# Patient Record
Sex: FEMALE | Race: WHITE | Employment: FULL TIME | URBAN - METROPOLITAN AREA
[De-identification: names, ages, dates, MRNs, and addresses within clinical notes are randomized per-mention and may not be internally consistent; named-entity substitution may affect disease eponyms.]

---

## 2017-12-26 ENCOUNTER — HOSPITAL ENCOUNTER (OUTPATIENT)
Dept: MAMMOGRAPHY | Age: 47
Discharge: HOME | End: 2017-12-26
Attending: INTERNAL MEDICINE | Admitting: INTERNAL MEDICINE

## 2017-12-26 DIAGNOSIS — R92.0 ABNORMAL MAMMOGRAM WITH MICROCALCIFICATION: ICD-10-CM

## 2018-01-10 ENCOUNTER — HOSPITAL ENCOUNTER (OUTPATIENT)
Dept: MRI IMAGING | Age: 48
Discharge: HOME | End: 2018-01-10
Attending: SURGERY | Admitting: SURGERY

## 2018-01-10 DIAGNOSIS — D05.12 INTRADUCTAL CARCINOMA OF LEFT BREAST: ICD-10-CM

## 2018-01-10 DIAGNOSIS — D05.12 INTRADUCTAL CARCINOMA IN SITU OF LEFT BREAST: ICD-10-CM

## 2018-05-31 ENCOUNTER — HOSPITAL ENCOUNTER (OUTPATIENT)
Dept: MRI IMAGING | Age: 48
Discharge: HOME | End: 2018-05-31
Attending: SURGERY | Admitting: SURGERY

## 2018-05-31 DIAGNOSIS — R92.8 ABNORMAL MAMMOGRAM: ICD-10-CM

## 2018-05-31 DIAGNOSIS — R92.8 OTHER ABNORMAL AND INCONCLUSIVE FINDINGS ON DIAGNOSTIC IMAGING OF BREAST: ICD-10-CM

## 2018-05-31 DIAGNOSIS — D05.10 DUCTAL CARCINOMA IN SITU (DCIS) OF BREAST, UNSPECIFIED LATERALITY: ICD-10-CM

## 2018-07-31 ENCOUNTER — HOSPITAL ENCOUNTER (OUTPATIENT)
Dept: WOMENS IMAGING | Age: 48
Discharge: HOME | End: 2018-07-31
Payer: COMMERCIAL

## 2018-07-31 DIAGNOSIS — Z13.820 OSTEOPOROSIS SCREENING: ICD-10-CM

## 2018-07-31 DIAGNOSIS — Z13.820 ENCOUNTER FOR SCREENING FOR OSTEOPOROSIS: ICD-10-CM

## 2018-07-31 PROCEDURE — 77080 DXA BONE DENSITY AXIAL: CPT

## 2018-12-12 ENCOUNTER — HOSPITAL ENCOUNTER (OUTPATIENT)
Dept: WOMENS IMAGING | Age: 48
Discharge: HOME | End: 2018-12-12
Payer: COMMERCIAL

## 2018-12-12 DIAGNOSIS — D05.90 CARCINOMA IN SITU OF BREAST, UNSPECIFIED LATERALITY, UNSPECIFIED TYPE: ICD-10-CM

## 2018-12-12 DIAGNOSIS — R92.8 ABNORMAL MAMMOGRAM: ICD-10-CM

## 2018-12-12 DIAGNOSIS — Z85.3 HX OF BREAST CANCER: ICD-10-CM

## 2018-12-12 PROCEDURE — 76642 ULTRASOUND BREAST LIMITED: CPT

## 2018-12-12 PROCEDURE — G0279 TOMOSYNTHESIS, MAMMO: HCPCS

## 2019-12-17 ENCOUNTER — HOSPITAL ENCOUNTER (OUTPATIENT)
Dept: WOMENS IMAGING | Age: 49
Discharge: HOME | End: 2019-12-17
Payer: COMMERCIAL

## 2019-12-17 DIAGNOSIS — R92.8 ABNORMAL MAMMOGRAM OF RIGHT BREAST: ICD-10-CM

## 2019-12-17 DIAGNOSIS — D05.12 DUCTAL CARCINOMA IN SITU (DCIS) OF LEFT BREAST: ICD-10-CM

## 2019-12-17 PROCEDURE — 76642 ULTRASOUND BREAST LIMITED: CPT

## 2019-12-17 PROCEDURE — G0279 TOMOSYNTHESIS, MAMMO: HCPCS

## 2020-01-14 ENCOUNTER — HOSPITAL ENCOUNTER (OUTPATIENT)
Dept: MRI IMAGING | Age: 50
Discharge: HOME | End: 2020-01-14
Payer: COMMERCIAL

## 2020-01-14 PROCEDURE — 6360000004 HC RX CONTRAST MEDICATION: Performed by: SURGERY

## 2020-01-14 PROCEDURE — A9579 GAD-BASE MR CONTRAST NOS,1ML: HCPCS | Performed by: SURGERY

## 2020-01-14 PROCEDURE — 77049 MRI BREAST C-+ W/CAD BI: CPT

## 2020-01-14 RX ADMIN — GADOTERIDOL 14 ML: 279.3 INJECTION, SOLUTION INTRAVENOUS at 12:26

## 2020-01-17 ENCOUNTER — TELEPHONE (OUTPATIENT)
Dept: WOMENS IMAGING | Age: 50
End: 2020-01-17

## 2020-01-17 NOTE — TELEPHONE ENCOUNTER
Nurse Navigator reviewed the education with the patient regarding an MRI biopsy:   *The technologist or a nurse may ask if you have allergies of any kind, such as an allergy to iodine or x-ray contrast material, drugs, food, or the environment, or if you have asthma. The contrast material most commonly used for an MRI exam contains a metal called gadolinium.  It is fine to eat and drink prior to the procedure and we prefer that you do so.  Bring a written list of the medications you are taking. Plan on being at the breast center for 2 -3 hours.  Wear a bra with good support (like a sports bra) and a two-piece comfortable outfit for the procedure.  You can bring someone with you but it is not required, since this is done with local anesthetic. You may drive yourself, or bring a family member or friend, whatever is comfortable and supportive.  If you have claustrophobia (fear of enclosed spaces) or anxiety, you may want to ask your physician for a prescription for a mild sedative prior to your scheduled examination. If this is done, please have someone drive you to the procedure. During your biopsy:   You will be lying on your stomach for this procedure just as when you had the MRI.  If a contrast material will be used in the MRI exam, the technologist will insert an intravenous (IV) catheter, into a vein in your hand or arm.  You will be placed into the magnet of the MRI unit and the radiologist and technologist will perform the examination while working at a computer outside of the room.  If a contrast material is used during the examination, it will be injected into the intravenous line (IV) after an initial series of scans. Additional series of images will be taken during or following the injection and at different points during the procedure.  Then the skin is cleaned and a local anesthetic (slight pin prick) is given to numb the specific area which takes effect very quickly.   A small skin nick is made so that the biopsy needle can be inserted.  Once the biopsy needle is in the proper position, samples will be taken. A tiny tissue marker (titanium) is then placed inside your breast at the site of the biopsy for future reference.  Pressure is then held over the biopsy site to stop the bleeding. No sutures are necessary as steri-strips and a waterproof dressing are applied.  Most women report little or no pain and no scarring on the breast, though some bruising can occur. After your biopsy:    A mammogram may be done to make sure the tissue marker is in the right place.  You will receive a set of follow-up instructions from the nurse navigator.  The biopsy sample is sent to the Pathology department for analysis.  Results of the biopsy will come back in 2-3 business days. The results will be sent to your referring physician. Patient verbalized understanding.

## 2020-01-24 ENCOUNTER — HOSPITAL ENCOUNTER (OUTPATIENT)
Dept: MRI IMAGING | Age: 50
Discharge: HOME | End: 2020-01-24
Payer: COMMERCIAL

## 2020-01-24 ENCOUNTER — HOSPITAL ENCOUNTER (OUTPATIENT)
Dept: WOMENS IMAGING | Age: 50
Discharge: HOME | End: 2020-01-24
Payer: COMMERCIAL

## 2020-01-24 PROCEDURE — 77065 DX MAMMO INCL CAD UNI: CPT

## 2020-01-24 PROCEDURE — A9579 GAD-BASE MR CONTRAST NOS,1ML: HCPCS | Performed by: SURGERY

## 2020-01-24 PROCEDURE — 6360000004 HC RX CONTRAST MEDICATION: Performed by: SURGERY

## 2020-01-24 PROCEDURE — 19086 BX BREAST ADD LESION MR IMAG: CPT

## 2020-01-24 PROCEDURE — 19085 BX BREAST 1ST LESION MR IMAG: CPT

## 2020-01-24 PROCEDURE — 88305 TISSUE EXAM BY PATHOLOGIST: CPT

## 2020-01-24 RX ADMIN — GADOTERIDOL 14 ML: 279.3 INJECTION, SOLUTION INTRAVENOUS at 12:13

## 2020-01-24 NOTE — PROGRESS NOTES
Patient in [facility] for breast biopsy. Radiologist reviewed procedure with patient, consent signed. Patient tolerated procedure well. Compression held. Site cleansed with chloraprep, steri strips and dry dressing applied. Ice pack provided. Reviewed discharge instructions with patient. Patient verbalized understanding and agreed to contact Nurse Navigator with any questions. Patient was A&Ox3 and steady on feet and discharged to waiting area. [facility] [address]  [address]  [address]  Telephone: [phone]    NAME:  Hailee Bras OF BIRTH:  1970  GENDER: female  MEDICAL RECORD NUMBER:  [id number]  TODAY'S DATE:  1/24/2020    Discharge Instructions [facility]:    Post Breast Biopsy Instructions     [x] You may remove your outer dressing in 24 hours and you may shower. \"Steri-strips\" tape will stay on for 5-7 days. [x] Place a cold pack inside your bra on top of the dressing for at least 3 hours, removing it every 15 minutes for 15 minutes after your biopsy. [x] Wear a firm fitting bra for at least 24 hours after your biopsy, including while you sleep. [x] Place an ice pack inside your bra on top of the dressing for at least 3 hours, removing it every 15 minutes for 15 minutes after your biopsy. [x] You may resume your held medications tomorrow, unless otherwise directed by your physician. [x] Your physician has instructed you to take Tylenol (Acetaminophen) the day of your biopsy for any discomfort. [x] Watch for excessive bleeding. If bleeding occurs apply pressure to site. Watch for signs of infection, increased pain, redness, swelling and heat. If this occurs call your physician. [x] Do not participate in any strenuous exercise for 48 hours after your biopsy and do not lift, pull or push anything over 5-10 lbs. [x] Results will be available in 2-3 working days.   Your referring physician or the Nurse Navigator will call you with results. [facility] Information:   Should you experience any significant changes in your health or have questions about your care, please contact:  Jose Girard Nurse [facility]ulator [facility], [address]  Monday-Friday. If you need help with your care outside these hours and cannot wait until we are again available, contact your Physician or go to the hospital emergency.        Electronically signed by Jose Girard RN on 1/24/2020 at 1:40 PM

## 2020-01-29 ENCOUNTER — HOSPITAL ENCOUNTER (OUTPATIENT)
Age: 50
Setting detail: SPECIMEN
Discharge: HOME | End: 2020-01-29
Payer: COMMERCIAL

## 2020-01-30 NOTE — PROGRESS NOTES
during my hospitalization, the order may or may not be in effect during this procedure. I give my doctor permission to give me blood or blood products. I understand that there are risks with receiving blood such as hepatitis, AIDS, fever, or allergic reaction. I acknowledge that the risks, benefits, and alternatives of this treatment have been explained to me and that no express or implied warranty has been given by the hospital, any blood bank, or any person or entity as to the blood or blood components transfused. At the discretion of my doctor, I agree to allow observers, equipment/product representatives and allow photographing, and/or televising of the procedure, provided my name or identity is maintained confidentially. I agree the hospital may dispose of or use for scientific or educational purposes any tissue, fluid, or body parts which may be removed.     ________________________________Date________Time______ am/pm  ([place] [facility])  Patient or Signature of Closest Relative or Legal Guardian    ________________________________Date________Time______am/pm      Page 1 of  1  Witness

## 2020-01-31 RX ORDER — TAMOXIFEN CITRATE 20 MG/1
TABLET ORAL
COMMUNITY
Start: 2020-01-07 | End: 2020-05-01 | Stop reason: ALTCHOICE

## 2020-01-31 NOTE — PROGRESS NOTES
[facility] PRE-SURGICAL TESTING INSTRUCTIONS                              PRIOR TO PROCEDURE DATE:  1. Please follow any guidelines/instructions prior to your procedure as advised by your surgeon. 2. Arrange for someone to drive you home and be with you for the first 24 hours after discharge for your safety after your procedure for which you received sedation. Ensure it is someone we can share information with regarding your discharge. 3. You must contact your surgeon for instructions IF:   You are taking any blood thinners, aspirin, anti-inflammatory or vitamin E.   There is a change in your physical condition such as a cold, fever, rash, cuts, sores or any other infection, especially near your surgical site. 4. Do not drink alcohol the day before or day of your procedure. 5. A Pre-op History and Physical for surgery MUST be completed by your Physician or Urgent Care within 30 days of your procedure date. Please bring a copy with you on the day of your procedure and along with any other testing performed. THE DAY OF YOUR PROCEDURE:  1. Follow instructions for ARRIVAL TIME as DIRECTED BY YOUR SURGEON. I    2. Enter the MAIN entrance from [employer] and follow the signs to the free [email or web address] or [employer] parking (offered free of charge 6am-5pm). 3. Enter the Main Entrance of the hospital (do not enter from the lower level of the parking garage). Upon entrance, check in with the  at the main desk on your left. If no one is available at the desk, proceed into the [facility] Waiting Room and go through the door directly into the [facility]. There is a Check-in desk ACROSS from Room 5 (marked with a sign hanging from the ceiling). The phone number for the surgery center is [phone]. 4. Please call [phone] option #2 option #2 if you have not been preregistered yet. On the day of your procedure bring your insurance card and photo ID.  You will be registered at your bedside once brought back to your room. 5. DO NOT EAT ANYTHING eight hours prior to surgery. May have 8 ounces of water 4 hours prior to surgery. 6. MEDICATIONS    Take the following medications with a SMALL sip of water: Tamoxifen   Use your usual dose of inhalers the morning of surgery. BRING your rescue inhaler with you to hospital.    Anesthesia does NOT want you to take insulin the morning of surgery. They will control your blood sugar while you are at the hospital. Please contact your ordering physician for instructions regarding your insulin the night before your procedure. If you have an insulin pump, please keep it set on basal rate. 7. Do not swallow water when brushing teeth. No gum, candy, mints or ice chips. Refrain from smoking or at least decrease the amount. 8. Dress in loose, comfortable clothing appropriate for redressing after your procedure. Do not wear jewelry (including body piercings), make-up (especially NO eye make-up), fingernail polish (NO toenail polish if foot/leg surgery), lotion, powders or metal hairclips. 9. Dentures, glasses, or contacts will need to be removed before your procedure. Bring cases for your glasses, contacts, dentures, or hearing aids to protect them while you are in surgery. 10. If you use a CPAP, please bring it with you on the day of your procedure. 11. We recommend that valuable personal  belongings such as cash, cell phones, e-tablets or jewelry, be left at home during your stay. The hospital will not be responsible for valuables that are not secured in the hospital safe. However, if your insurance requires a co-pay, you may want to bring a method of payment, i.e. Check or credit card, if you wish to pay your co-pay the day of surgery. 12. If you are to stay overnight, you may bring a bag with personal items.  Please have any large items you may need brought in by your family after your arrival to your hospital potential side effects. 2. For comfort and safety, arrange to have someone at home with you for the first 24 hours after discharge. 3. You and your family will be given written instructions about your diet, activity, dressing care, medications, and return visits. 4. Once at home, should issues with nausea, pain, or bleeding occur, or should you notice any signs of infection, you should call your surgeon. 5. Always clean your hands before and after caring for your wound. Do not let your family touch your surgery site without cleaning their hands. 6. Narcotic pain medications can cause significant constipation. You may want to add a stool softener to your postoperative medication schedule or speak to your surgeon on how best to manage this SIDE EFFECT. SPECIAL INSTRUCTIONS     Thank you for allowing us to care for you. We strive to exceed your expectations in the delivery of care and service provided to you and your family. If you need to contact us for any reason, please call us at [phone]    Instructions reviewed with patient during preadmission testing phone interview. Lynne Lora. 1/31/2020 .12:22 PM      ADDITIONAL EDUCATIONAL INFORMATION REVIEWED PER PHONE WITH YOU AND/OR YOUR FAMILY:  Yes Bring a urine sample on day of surgery  No Pain Goal-Taking Control of Your Pain  Yes FAQs about Surgical Site Infections  No Hibiclens® Bathing Instructions   Yes Antibacterial Soap  No Juan Antonio® Wipes Bathing Instructions (Obtained from: https://www.[employer]/. pdf )  No Incentive Spirometer Education  No Other

## 2020-02-04 ENCOUNTER — ANESTHESIA EVENT (OUTPATIENT)
Dept: OPERATING ROOM | Age: 50
End: 2020-02-04
Payer: COMMERCIAL

## 2020-02-05 ENCOUNTER — HOSPITAL ENCOUNTER (OUTPATIENT)
Dept: MAMMOGRAPHY | Age: 50
Discharge: HOME | End: 2020-02-05
Payer: COMMERCIAL

## 2020-02-05 ENCOUNTER — HOSPITAL ENCOUNTER (OUTPATIENT)
Dept: NUCLEAR MEDICINE | Age: 50
Discharge: HOME | End: 2020-02-05
Payer: COMMERCIAL

## 2020-02-05 ENCOUNTER — HOSPITAL ENCOUNTER (OUTPATIENT)
Age: 50
Setting detail: OUTPATIENT SURGERY
Discharge: HOME | End: 2020-02-05
Attending: SURGERY | Admitting: SURGERY
Payer: COMMERCIAL

## 2020-02-05 ENCOUNTER — ANESTHESIA (OUTPATIENT)
Dept: OPERATING ROOM | Age: 50
End: 2020-02-05
Payer: COMMERCIAL

## 2020-02-05 VITALS
OXYGEN SATURATION: 95 % | TEMPERATURE: 97.5 F | HEART RATE: 74 BPM | SYSTOLIC BLOOD PRESSURE: 112 MMHG | WEIGHT: 150 LBS | DIASTOLIC BLOOD PRESSURE: 76 MMHG | RESPIRATION RATE: 16 BRPM | HEIGHT: 64 IN | BODY MASS INDEX: 25.61 KG/M2

## 2020-02-05 VITALS
OXYGEN SATURATION: 100 % | DIASTOLIC BLOOD PRESSURE: 53 MMHG | SYSTOLIC BLOOD PRESSURE: 87 MMHG | RESPIRATION RATE: 19 BRPM | TEMPERATURE: 95 F

## 2020-02-05 LAB — PREGNANCY, URINE: NEGATIVE

## 2020-02-05 PROCEDURE — A9541 TC99M SULFUR COLLOID: HCPCS | Performed by: SURGERY

## 2020-02-05 PROCEDURE — 88342 IMHCHEM/IMCYTCHM 1ST ANTB: CPT

## 2020-02-05 PROCEDURE — 2580000003 HC RX 258: Performed by: ANESTHESIOLOGY

## 2020-02-05 PROCEDURE — 88305 TISSUE EXAM BY PATHOLOGIST: CPT

## 2020-02-05 PROCEDURE — 76098 X-RAY EXAM SURGICAL SPECIMEN: CPT

## 2020-02-05 PROCEDURE — 6360000002 HC RX W HCPCS: Performed by: SURGERY

## 2020-02-05 PROCEDURE — 6360000002 HC RX W HCPCS: Performed by: NURSE ANESTHETIST, CERTIFIED REGISTERED

## 2020-02-05 PROCEDURE — 3600000013 HC SURGERY LEVEL 3 ADDTL 15MIN: Performed by: SURGERY

## 2020-02-05 PROCEDURE — 38792 RA TRACER ID OF SENTINL NODE: CPT

## 2020-02-05 PROCEDURE — 2500000003 HC RX 250 WO HCPCS: Performed by: SURGERY

## 2020-02-05 PROCEDURE — 6360000002 HC RX W HCPCS: Performed by: ANESTHESIOLOGY

## 2020-02-05 PROCEDURE — 3430000000 HC RX DIAGNOSTIC RADIOPHARMACEUTICAL: Performed by: SURGERY

## 2020-02-05 PROCEDURE — 3700000000 HC ANESTHESIA ATTENDED CARE: Performed by: SURGERY

## 2020-02-05 PROCEDURE — 2709999900 MAM NEEDLE BREAST LOCALIZATION RIGHT

## 2020-02-05 PROCEDURE — 3600000003 HC SURGERY LEVEL 3 BASE: Performed by: SURGERY

## 2020-02-05 PROCEDURE — 2580000003 HC RX 258: Performed by: SURGERY

## 2020-02-05 PROCEDURE — 2500000003 HC RX 250 WO HCPCS: Performed by: NURSE ANESTHETIST, CERTIFIED REGISTERED

## 2020-02-05 PROCEDURE — 2709999900 HC NON-CHARGEABLE SUPPLY: Performed by: SURGERY

## 2020-02-05 PROCEDURE — 7100000011 HC PHASE II RECOVERY - ADDTL 15 MIN: Performed by: SURGERY

## 2020-02-05 PROCEDURE — 3700000001 HC ADD 15 MINUTES (ANESTHESIA): Performed by: SURGERY

## 2020-02-05 PROCEDURE — 88307 TISSUE EXAM BY PATHOLOGIST: CPT

## 2020-02-05 PROCEDURE — 84703 CHORIONIC GONADOTROPIN ASSAY: CPT

## 2020-02-05 PROCEDURE — 7100000001 HC PACU RECOVERY - ADDTL 15 MIN: Performed by: SURGERY

## 2020-02-05 PROCEDURE — 7100000000 HC PACU RECOVERY - FIRST 15 MIN: Performed by: SURGERY

## 2020-02-05 PROCEDURE — 7100000010 HC PHASE II RECOVERY - FIRST 15 MIN: Performed by: SURGERY

## 2020-02-05 PROCEDURE — A4648 IMPLANTABLE TISSUE MARKER: HCPCS | Performed by: SURGERY

## 2020-02-05 PROCEDURE — 88331 PATH CONSLTJ SURG 1 BLK 1SPC: CPT

## 2020-02-05 DEVICE — THE MARKER IS A RADIOGRAPHIC IMPLANTABLE MARKER USED TO MARK SOFT TISSUE.IT IS COMPRISED OF A BIOABSORBABLE SPACER THAT HOLDS RADIOPAQUE MARKER CLIPS.
Type: IMPLANTABLE DEVICE | Site: BREAST | Status: FUNCTIONAL
Brand: BIOZORB MARKER

## 2020-02-05 RX ORDER — SODIUM CHLORIDE 0.9 % (FLUSH) 0.9 %
10 SYRINGE (ML) INJECTION EVERY 12 HOURS SCHEDULED
Status: DISCONTINUED | OUTPATIENT
Start: 2020-02-05 | End: 2020-02-05 | Stop reason: HOSPADM

## 2020-02-05 RX ORDER — DEXAMETHASONE SODIUM PHOSPHATE 4 MG/ML
INJECTION, SOLUTION INTRA-ARTICULAR; INTRALESIONAL; INTRAMUSCULAR; INTRAVENOUS; SOFT TISSUE PRN
Status: DISCONTINUED | OUTPATIENT
Start: 2020-02-05 | End: 2020-02-05 | Stop reason: SDUPTHER

## 2020-02-05 RX ORDER — HYDROMORPHONE HCL 110MG/55ML
PATIENT CONTROLLED ANALGESIA SYRINGE INTRAVENOUS PRN
Status: DISCONTINUED | OUTPATIENT
Start: 2020-02-05 | End: 2020-02-05 | Stop reason: SDUPTHER

## 2020-02-05 RX ORDER — FENTANYL CITRATE 50 UG/ML
25 INJECTION, SOLUTION INTRAMUSCULAR; INTRAVENOUS EVERY 5 MIN PRN
Status: DISCONTINUED | OUTPATIENT
Start: 2020-02-05 | End: 2020-02-05 | Stop reason: HOSPADM

## 2020-02-05 RX ORDER — LIDOCAINE HYDROCHLORIDE 20 MG/ML
INJECTION, SOLUTION INFILTRATION; PERINEURAL PRN
Status: DISCONTINUED | OUTPATIENT
Start: 2020-02-05 | End: 2020-02-05 | Stop reason: SDUPTHER

## 2020-02-05 RX ORDER — MIDAZOLAM HYDROCHLORIDE 1 MG/ML
INJECTION INTRAMUSCULAR; INTRAVENOUS PRN
Status: DISCONTINUED | OUTPATIENT
Start: 2020-02-05 | End: 2020-02-05 | Stop reason: SDUPTHER

## 2020-02-05 RX ORDER — LIDOCAINE HYDROCHLORIDE 10 MG/ML
1 INJECTION, SOLUTION EPIDURAL; INFILTRATION; INTRACAUDAL; PERINEURAL
Status: DISCONTINUED | OUTPATIENT
Start: 2020-02-05 | End: 2020-02-05 | Stop reason: HOSPADM

## 2020-02-05 RX ORDER — HYDROCODONE BITARTRATE AND ACETAMINOPHEN 5; 325 MG/1; MG/1
1 TABLET ORAL EVERY 4 HOURS PRN
Qty: 28 TABLET | Refills: 0 | Status: SHIPPED | OUTPATIENT
Start: 2020-02-05 | End: 2020-02-12

## 2020-02-05 RX ORDER — CEPHALEXIN 500 MG/1
500 CAPSULE ORAL 2 TIMES DAILY
Qty: 20 CAPSULE | Refills: 0 | Status: SHIPPED | OUTPATIENT
Start: 2020-02-05 | End: 2020-02-15

## 2020-02-05 RX ORDER — ONDANSETRON 2 MG/ML
4 INJECTION INTRAMUSCULAR; INTRAVENOUS
Status: DISCONTINUED | OUTPATIENT
Start: 2020-02-05 | End: 2020-02-05 | Stop reason: HOSPADM

## 2020-02-05 RX ORDER — SUCCINYLCHOLINE CHLORIDE 20 MG/ML
INJECTION INTRAMUSCULAR; INTRAVENOUS PRN
Status: DISCONTINUED | OUTPATIENT
Start: 2020-02-05 | End: 2020-02-05 | Stop reason: SDUPTHER

## 2020-02-05 RX ORDER — ONDANSETRON 2 MG/ML
INJECTION INTRAMUSCULAR; INTRAVENOUS PRN
Status: DISCONTINUED | OUTPATIENT
Start: 2020-02-05 | End: 2020-02-05 | Stop reason: SDUPTHER

## 2020-02-05 RX ORDER — PROPOFOL 10 MG/ML
INJECTION, EMULSION INTRAVENOUS PRN
Status: DISCONTINUED | OUTPATIENT
Start: 2020-02-05 | End: 2020-02-05 | Stop reason: SDUPTHER

## 2020-02-05 RX ORDER — BUPIVACAINE HYDROCHLORIDE AND EPINEPHRINE 5; 5 MG/ML; UG/ML
INJECTION, SOLUTION EPIDURAL; INTRACAUDAL; PERINEURAL PRN
Status: DISCONTINUED | OUTPATIENT
Start: 2020-02-05 | End: 2020-02-05 | Stop reason: ALTCHOICE

## 2020-02-05 RX ORDER — ISOSULFAN BLUE 50 MG/5ML
INJECTION, SOLUTION SUBCUTANEOUS PRN
Status: DISCONTINUED | OUTPATIENT
Start: 2020-02-05 | End: 2020-02-05 | Stop reason: ALTCHOICE

## 2020-02-05 RX ORDER — ROCURONIUM BROMIDE 10 MG/ML
INJECTION, SOLUTION INTRAVENOUS PRN
Status: DISCONTINUED | OUTPATIENT
Start: 2020-02-05 | End: 2020-02-05 | Stop reason: SDUPTHER

## 2020-02-05 RX ORDER — SODIUM CHLORIDE 0.9 % (FLUSH) 0.9 %
10 SYRINGE (ML) INJECTION PRN
Status: DISCONTINUED | OUTPATIENT
Start: 2020-02-05 | End: 2020-02-05 | Stop reason: HOSPADM

## 2020-02-05 RX ORDER — GLYCOPYRROLATE 0.2 MG/ML
INJECTION INTRAMUSCULAR; INTRAVENOUS PRN
Status: DISCONTINUED | OUTPATIENT
Start: 2020-02-05 | End: 2020-02-05 | Stop reason: SDUPTHER

## 2020-02-05 RX ORDER — 0.9 % SODIUM CHLORIDE 0.9 %
500 INTRAVENOUS SOLUTION INTRAVENOUS
Status: DISCONTINUED | OUTPATIENT
Start: 2020-02-05 | End: 2020-02-05 | Stop reason: HOSPADM

## 2020-02-05 RX ORDER — MAGNESIUM HYDROXIDE 1200 MG/15ML
LIQUID ORAL CONTINUOUS PRN
Status: COMPLETED | OUTPATIENT
Start: 2020-02-05 | End: 2020-02-05

## 2020-02-05 RX ORDER — SODIUM CHLORIDE, SODIUM LACTATE, POTASSIUM CHLORIDE, CALCIUM CHLORIDE 600; 310; 30; 20 MG/100ML; MG/100ML; MG/100ML; MG/100ML
INJECTION, SOLUTION INTRAVENOUS CONTINUOUS
Status: DISCONTINUED | OUTPATIENT
Start: 2020-02-05 | End: 2020-02-05 | Stop reason: HOSPADM

## 2020-02-05 RX ADMIN — CEFAZOLIN 2 G: 10 INJECTION, POWDER, FOR SOLUTION INTRAVENOUS at 12:23

## 2020-02-05 RX ADMIN — ONDANSETRON 4 MG: 2 INJECTION INTRAMUSCULAR; INTRAVENOUS at 12:23

## 2020-02-05 RX ADMIN — SUCCINYLCHOLINE CHLORIDE 100 MG: 20 INJECTION, SOLUTION INTRAMUSCULAR; INTRAVENOUS; PARENTERAL at 12:25

## 2020-02-05 RX ADMIN — Medication 855 MICRO CURIE: at 12:42

## 2020-02-05 RX ADMIN — GLYCOPYRROLATE 0.4 MG: 0.2 INJECTION INTRAMUSCULAR; INTRAVENOUS at 13:29

## 2020-02-05 RX ADMIN — ROCURONIUM BROMIDE 30 MG: 10 INJECTION, SOLUTION INTRAVENOUS at 12:34

## 2020-02-05 RX ADMIN — PROPOFOL 50 MG: 10 INJECTION, EMULSION INTRAVENOUS at 12:22

## 2020-02-05 RX ADMIN — HYDROMORPHONE HYDROCHLORIDE 0.5 MG: 1 INJECTION, SOLUTION INTRAMUSCULAR; INTRAVENOUS; SUBCUTANEOUS at 14:41

## 2020-02-05 RX ADMIN — DEXAMETHASONE SODIUM PHOSPHATE 8 MG: 4 INJECTION, SOLUTION INTRAMUSCULAR; INTRAVENOUS at 12:23

## 2020-02-05 RX ADMIN — SODIUM CHLORIDE, SODIUM LACTATE, POTASSIUM CHLORIDE, AND CALCIUM CHLORIDE: 600; 310; 30; 20 INJECTION, SOLUTION INTRAVENOUS at 09:45

## 2020-02-05 RX ADMIN — PROPOFOL 50 MG: 10 INJECTION, EMULSION INTRAVENOUS at 12:17

## 2020-02-05 RX ADMIN — HYDROMORPHONE HYDROCHLORIDE 1 MG: 2 INJECTION, SOLUTION INTRAMUSCULAR; INTRAVENOUS; SUBCUTANEOUS at 12:31

## 2020-02-05 RX ADMIN — SODIUM CHLORIDE, SODIUM LACTATE, POTASSIUM CHLORIDE, AND CALCIUM CHLORIDE: 600; 310; 30; 20 INJECTION, SOLUTION INTRAVENOUS at 12:58

## 2020-02-05 RX ADMIN — MIDAZOLAM HYDROCHLORIDE 2 MG: 2 INJECTION, SOLUTION INTRAMUSCULAR; INTRAVENOUS at 12:06

## 2020-02-05 RX ADMIN — PROPOFOL 250 MG: 10 INJECTION, EMULSION INTRAVENOUS at 12:15

## 2020-02-05 RX ADMIN — NEOSTIGMINE METHYLSULFATE 3 MG: 1 INJECTION INTRAMUSCULAR; INTRAVENOUS; SUBCUTANEOUS at 13:29

## 2020-02-05 RX ADMIN — LIDOCAINE HYDROCHLORIDE 60 MG: 20 INJECTION, SOLUTION INFILTRATION; PERINEURAL at 12:57

## 2020-02-05 ASSESSMENT — PULMONARY FUNCTION TESTS
PIF_VALUE: 19
PIF_VALUE: 22
PIF_VALUE: 19
PIF_VALUE: 19
PIF_VALUE: 20
PIF_VALUE: 20
PIF_VALUE: 15
PIF_VALUE: 20
PIF_VALUE: 15
PIF_VALUE: 22
PIF_VALUE: 22
PIF_VALUE: 18
PIF_VALUE: 19
PIF_VALUE: 19
PIF_VALUE: 15
PIF_VALUE: 14
PIF_VALUE: 20
PIF_VALUE: 9
PIF_VALUE: 1
PIF_VALUE: 22
PIF_VALUE: 19
PIF_VALUE: 20
PIF_VALUE: 1
PIF_VALUE: 20
PIF_VALUE: 26
PIF_VALUE: 15
PIF_VALUE: 19
PIF_VALUE: 19
PIF_VALUE: 20
PIF_VALUE: 22
PIF_VALUE: 15
PIF_VALUE: 19
PIF_VALUE: 19
PIF_VALUE: 1
PIF_VALUE: 19
PIF_VALUE: 20
PIF_VALUE: 14
PIF_VALUE: 19
PIF_VALUE: 22
PIF_VALUE: 26
PIF_VALUE: 19
PIF_VALUE: 21
PIF_VALUE: 20
PIF_VALUE: 14
PIF_VALUE: 19
PIF_VALUE: 20
PIF_VALUE: 14
PIF_VALUE: 21
PIF_VALUE: 20
PIF_VALUE: 19
PIF_VALUE: 14
PIF_VALUE: 20
PIF_VALUE: 19
PIF_VALUE: 16
PIF_VALUE: 30
PIF_VALUE: 23
PIF_VALUE: 20
PIF_VALUE: 15
PIF_VALUE: 20
PIF_VALUE: 15
PIF_VALUE: 14
PIF_VALUE: 19
PIF_VALUE: 20
PIF_VALUE: 13
PIF_VALUE: 15
PIF_VALUE: 20
PIF_VALUE: 10
PIF_VALUE: 19
PIF_VALUE: 15
PIF_VALUE: 16
PIF_VALUE: 19
PIF_VALUE: 19
PIF_VALUE: 17
PIF_VALUE: 20
PIF_VALUE: 14
PIF_VALUE: 14
PIF_VALUE: 20
PIF_VALUE: 22
PIF_VALUE: 19
PIF_VALUE: 14
PIF_VALUE: 19
PIF_VALUE: 2
PIF_VALUE: 19
PIF_VALUE: 15
PIF_VALUE: 15
PIF_VALUE: 17
PIF_VALUE: 19
PIF_VALUE: 1
PIF_VALUE: 16
PIF_VALUE: 20
PIF_VALUE: 23
PIF_VALUE: 21
PIF_VALUE: 2
PIF_VALUE: 24
PIF_VALUE: 16
PIF_VALUE: 20
PIF_VALUE: 19
PIF_VALUE: 20
PIF_VALUE: 17
PIF_VALUE: 0
PIF_VALUE: 15
PIF_VALUE: 20
PIF_VALUE: 13
PIF_VALUE: 20
PIF_VALUE: 17
PIF_VALUE: 1
PIF_VALUE: 15
PIF_VALUE: 19

## 2020-02-05 ASSESSMENT — PAIN DESCRIPTION - DESCRIPTORS: DESCRIPTORS: DISCOMFORT

## 2020-02-05 ASSESSMENT — ENCOUNTER SYMPTOMS: SHORTNESS OF BREATH: 0

## 2020-02-05 ASSESSMENT — PAIN SCALES - GENERAL
PAINLEVEL_OUTOF10: 0
PAINLEVEL_OUTOF10: 1
PAINLEVEL_OUTOF10: 8

## 2020-02-05 ASSESSMENT — PAIN DESCRIPTION - LOCATION: LOCATION: BREAST

## 2020-02-05 ASSESSMENT — PAIN DESCRIPTION - ORIENTATION: ORIENTATION: RIGHT

## 2020-02-05 ASSESSMENT — PAIN - FUNCTIONAL ASSESSMENT: PAIN_FUNCTIONAL_ASSESSMENT: 0-10

## 2020-02-05 ASSESSMENT — PAIN DESCRIPTION - FREQUENCY: FREQUENCY: INTERMITTENT

## 2020-02-05 ASSESSMENT — PAIN DESCRIPTION - PAIN TYPE: TYPE: SURGICAL PAIN

## 2020-02-05 NOTE — PROGRESS NOTES
Current Allergies: Patient has no known allergies. Admitted to PACU bed 10 from OR. Arrived on a stretcher . Attached to PACU monitoring system. Alarms and parameters set. Report received from anesthesia personnel Ella Jiménez RN  OR staff did not report skin issues that were observed while in OR  No problems reported intraoperatively. Reported that patient arrived to OR and maintained a lower BP during surgery. Pt arrived with oxygen per nasal cannula with oxygen at 2 liters. Yo alas in place and compressed. No drainage in tubing or bulb. Surgical bra in place  Athrombic wraps in place.

## 2020-02-05 NOTE — PROGRESS NOTES
CLINICAL PHARMACY NOTE: MEDS TO [address] Select Patient?: No  Total # of Prescriptions Filled: 2   The following medications were delivered to the patient:  · Norco  · Keflex  Total # of Interventions Completed: 1  Time Spent (min): 15    Additional Documentation:

## 2020-02-05 NOTE — ANESTHESIA POSTPROCEDURE EVALUATION
Department of Anesthesiology  Postprocedure Note    Patient: Elizabeth Ayala  MRN: [id number]  YOB: 1970  Date of evaluation: 2/5/2020  Time:  6:25 PM     Procedure Summary     Date:  02/05/20 Room / Location:  [address] [place] [address] [id number] / [facility]    Anesthesia Start:  4493 Anesthesia Stop:  0592    Procedure:  RIGHT BREAST LUMPECTOMY WITH NEEDLE LOCALIZATION, SENTINEL NODE BIOPSY, PLACEMENT OF BIOZORB (Right ) Diagnosis:  (MALIGNANT NEOPLASM UPPER OUTER RIGHT BREAST)    Surgeon:  Katia Gupta MD Responsible Provider:  Chela Victoria DO    Anesthesia Type:  general ASA Status:  2          Anesthesia Type: general    Darren Phase I: Darren Score: 10    Darren Phase II: Darren Score: 10    Last vitals: Reviewed and per EMR flowsheets.        Anesthesia Post Evaluation    Patient location during evaluation: PACU  Patient participation: complete - patient participated  Level of consciousness: awake and alert  Airway patency: patent  Nausea & Vomiting: no nausea and no vomiting  Cardiovascular status: blood pressure returned to baseline  Respiratory status: acceptable  Hydration status: euvolemic

## 2020-02-05 NOTE — ANESTHESIA PRE PROCEDURE
Department of Anesthesiology  Preprocedure Note       Name:  Madison Perez   Age:  47 y.o.  :  1970                                          MRN:  [id number]         Date:  2020      Surgeon: Rodney Dubon):  Kyle Iniguez MD    Procedure: RIGHT BREAST LUMPECTOMY WITH NEEDLE LOCALIZATION, SENTINEL NODE BIOPSY (Right )    Medications prior to admission:   Prior to Admission medications    Medication Sig Start Date End Date Taking?  Authorizing Provider   tamoxifen (NOLVADEX) 20 MG tablet  20  Yes Historical Provider, MD       Current medications:    Current Facility-Administered Medications   Medication Dose Route Frequency Provider Last Rate Last Dose    ceFAZolin (ANCEF) 2 g in dextrose 5 % 50 mL IVPB  2 g Intravenous Once Kyle Iniguez MD        lactated ringers infusion   Intravenous Continuous Fartun Davila  mL/hr at 20 0945      sodium chloride flush 0.9 % injection 10 mL  10 mL Intravenous 2 times per day Fartun Davila MD        sodium chloride flush 0.9 % injection 10 mL  10 mL Intravenous PRN Fartun Davila MD        lidocaine PF 1 % injection 1 mL  1 mL Intradermal Once PRN Fartun Davila MD         Facility-Administered Medications Ordered in Other Encounters   Medication Dose Route Frequency Provider Last Rate Last Dose    technetium filtered sulfur colloid solution 800 micro curie  800 micro curie Subcutaneous ONCE PRN Kyle Iniguez MD           Allergies:  No Known Allergies    Problem List:    Patient Active Problem List   Diagnosis Code    Chronic cough R05    GERD (gastroesophageal reflux disease) K21.9       Past Medical History:        Diagnosis Date    Cancer ([facility] [address]     left breast and right       Past Surgical History:        Procedure Laterality Date    BREAST SURGERY Left     lumpectomy     SECTION      DILATION AND CURETTAGE OF UTERUS      TUBAL LIGATION         Social History:    Social History     Tobacco Use    Smoking status: Never Smoker    Smokeless tobacco: Never Used   Substance Use Topics    Alcohol use: No                                Counseling given: Not Answered      Vital Signs (Current):   Vitals:    01/31/20 1219 02/05/20 0917   BP:  105/72   Pulse:  82   Resp:  16   Temp:  98.3 °F (36.8 °C)   TempSrc:  Oral   SpO2:  97%   Weight: 150 lb (68 kg)    Height: 5' 4\" (1.626 m)                                               BP Readings from Last 3 Encounters:   02/05/20 105/72   10/31/13 100/62   10/03/13 94/66       NPO Status: Time of last liquid consumption: 0600(sip with meds)                        Time of last solid consumption: 0600(sip with meds and last solid meal was around 1800 on 2-4-20)                        Date of last liquid consumption: 02/05/20                        Date of last solid food consumption: 02/05/20    BMI:   Wt Readings from Last 3 Encounters:   01/31/20 150 lb (68 kg)   10/31/13 153 lb 3.2 oz (69.5 kg)   10/03/13 151 lb (68.5 kg)     Body mass index is 25.75 kg/m². CBC: No results found for: WBC, RBC, HGB, HCT, MCV, RDW, PLT    CMP: No results found for: NA, K, CL, CO2, BUN, CREATININE, GFRAA, AGRATIO, LABGLOM, GLUCOSE, PROT, CALCIUM, BILITOT, ALKPHOS, AST, ALT    POC Tests: No results for input(s): POCGLU, POCNA, POCK, POCCL, POCBUN, POCHEMO, POCHCT in the last 72 hours.     Coags: No results found for: PROTIME, INR, APTT    HCG (If Applicable):   Lab Results   Component Value Date    PREGTESTUR Negative 02/05/2020        ABGs: No results found for: PHART, PO2ART, NVP3BWI, KAI1XEZ, BEART, Z6HAXBBL     Type & Screen (If Applicable):  No results found for: LABABO, LABRH    Anesthesia Evaluation   no history of anesthetic complications:   Airway: Mallampati: III  TM distance: >3 FB   Neck ROM: full  Mouth opening: > = 3 FB Dental:          Pulmonary:       (-) asthma and shortness of breath                           Cardiovascular:  Exercise tolerance: good (>4 METS),

## 2020-02-05 NOTE — BRIEF OP NOTE
Brief Postoperative Note  ______________________________________________________________    Patient: Vincent Burgos  YOB: 1970  MRN: [id number]  Date of Procedure: 2/5/2020    Pre-Op Diagnosis: MALIGNANT NEOPLASM UPPER OUTER RIGHT BREAST    Post-Op Diagnosis: Same       Procedure(s):  RIGHT BREAST LUMPECTOMY WITH NEEDLE LOCALIZATION, SENTINEL NODE BIOPSY    Anesthesia: General    Surgeon(s):  Arash Garcia MD    Assistant: Winston Ayala    Estimated Blood Loss (mL): 20 cc    Complications: None    Specimens:   ID Type Source Tests Collected by Time Destination   A : SENTINEL NODE #1 RIGHT AXILLA Tissue Breast SURGICAL PATHOLOGY Arash Garcia MD 2/5/2020 1250    B : SENTINEL NODE #2 RIGHT AXILLA Tissue Breast SURGICAL PATHOLOGY Aarsh Garcia MD 2/5/2020 [address]    C : RIGHT BREAST TISSUE Tissue Breast SURGICAL PATHOLOGY Arash Garcia MD 2/5/2020 1311    D : CRANIAL MARGIN RIGHT BREAST  Tissue Breast SURGICAL PATHOLOGY Arash Garcia MD 2/5/2020 [address]    E : MEDIAL MARGIN RIGHT BREAST  Tissue Breast SURGICAL PATHOLOGY Arash Garcia MD 2/5/2020 [address] [facility]    F : CAUDAL MARGIN RIGHT BREAST  Tissue Breast SURGICAL PATHOLOGY Arash Garcia MD 2/5/2020 [address]    G : LATERAL MARGIN RIGHT BREAST  Tissue Breast SURGICAL PATHOLOGY Arash Garcia MD 2/5/2020 [address]    H : POSTERIOR MARGIN RIGHT BREAST  Tissue Breast SURGICAL PATHOLOGY Arash Garcia MD 2/5/2020 [address]    I : ANTERIOR MARGIN RIGHT BREAST  Tissue Breast SURGICAL PATHOLOGY Arash Garcia MD 2/5/2020 1320        Implants:  Biozorb 2 x3       Drains:   [REMOVED] NG/OG/NJ/NE Tube Orogastric 25 fr Center mouth (Removed)       Findings: wire with clips in place, sentinel node biopsy x2 negative.      Arash Garcia MD  Date: 2/5/2020  Time: 1:38 PM

## 2020-02-11 NOTE — OP NOTE
was made using a 15 blade scalpel. Dissection was then carried down using Bovie electrocautery to create skin flaps circumferentially around the area of breast tissue to be removed. The wire was then brought into the surgical field. Care was taken to prevent the wire from displacement. Electrocautery was then used to circumferentially dissect and excise the breast tissue using the wire as a guide. Once completely around the wire, the breast tissue was removed. The specimen was then tagged with two sutures labelling the long suture lateral and short suture superior. The specimen was passed off the operative field, placed in a Dubin grid container and sent to the imaging department. Imaging confirmed the wire, abnormality, and clip (placed at the time of previous biopsy) were all within the specimen per radiology. The specimen was the sent to pathology. The cavity was then inspected and hemostasis was achieved with electrocautery. The cavity was then irrigated with saline an suctioned. The cavity was once again inspected with good hemostasis noted. The cavity was measured and a  2 x 3 cm biozorb was selected. A 3-0 vicryl stitch was placed into the cavity and secured around the biozorb. The tissue flaps were then closed over the biozorb. The probe was run over the supra and infraclavicular nodes and internal mammary chain nodes with no uptake noted. An axillary incision was made at the level near the hair line where the hot spot was previously found with a 15 blade scalpel. Dissection was taken down to the level of the axillary tissue using Bovie electrocautery until blue dye was seen. I then used the probe to identify of radiolabelled material located with an audible counter, along with palpation were used to locate all sentinel lymph nodes. A total of two sentinel lymph nodes located and removed. The first sentinel lymph node was level 2, blue and had a ex-vivo count of 1285.   The second sentinel node was level 1, no blue dye and count of 60. Once all sentinel lymph nodes were removed they were sent to pathology for intraoperative evaluation. Intraoperative pathology reported that all sentinel lymph nodes removed were negative for malignancy. A final  bed count was taken and was 2. A drain was tunneled into the axilla and secured with a silk stitch. The deep dermal tissue was re-approximated with interrupted 3-0 Vicryl sutures. The skin was then closed using a 4-0 Monocryl suture in a running subcuticular fashion. Both surgical sites were cleaned, dried, and surgical glue was then applied. The patient was awakened in the operating room, and taken to the recovery room in stable condition. All sponge, instrument and needle counts were correct at the end of the procedure.     Zahra Abdalla MD

## 2020-02-12 ENCOUNTER — HOSPITAL ENCOUNTER (OUTPATIENT)
Dept: MAMMOGRAPHY | Age: 50
Discharge: HOME | End: 2020-02-12
Payer: COMMERCIAL

## 2020-02-12 PROCEDURE — 96040 HC GENETIC COUNSELING: CPT

## 2020-02-12 NOTE — PROGRESS NOTES
[facility]  New Visit Clinic Note      Patient Name: López Anaya             YOB: 1970  MRN: [id number]  Date of Visit: 2/12/2020          López Anaya was referred by Dr. Mahi Pop for genetic counseling due to her personal history of bilateral breast cancer and family history of cancer. Ms. Will Andersen was seen in the [facility] at [facility] on  2/12/2020. Jasmin Quarles, Licensed Genetic Counselor, spent 40 minutes collecting and reviewing personal and family medical information, providing genetic risk assessment, genetic counseling and psychosocial assessment. Clinical History: López Anaya is a 47 y.o. female with a personal history of bilateral breast cancer. She was diagnosed with DCIS at age 49, and then recently diagnosed with IDC at age 47. She reports no additional history of cancer or tumors. Cancer risk factors:  Age at menarche: 5  Menopause: pre-menopausal  Parity: 2  Age at first full-term pregnancy: 33  Breastfeeding: yes  Oral Contraceptives: yes, 1 year  Hormone replacement therapy: no  Alcohol consumption: no  Smoking status: never smoker  Radiation exposure: treatment for breast cancer  JENNY/BSO: no  Previous breast biopsies: yes    Cancer Surveillance:   Mammogram: annual  Colonoscopy: no  Polyps: n/a  Upper endoscopy: yes, no concerns  Prostate exam: n/a    Psychosocial history: no concerns reported    Family History:   · Brother, dx. 52 with glioblastoma  · Father, dx. 42's with benign tumor in jaw  · Paternal uncle, dx. 42's with liver cancer (worked in [place])  · Paternal grandmother, dx. [de-identified] with liver cancer  · Paternal grandfather, dx. [de-identified] with lung cancer    Ancestry:   Manda[place] ancestry: no    Cancer Risk Assessment and Genetic Testing: We had a detailed discussion surrounding the concepts of hereditary, familial and sporadic cancer.   Regarding the hereditary forms of cancer, autosomal

## 2020-04-27 PROBLEM — C50.811 MALIGNANT NEOPLASM OF OVERLAPPING SITES OF RIGHT FEMALE BREAST (HCC): Status: ACTIVE | Noted: 2020-04-27

## 2020-04-27 PROBLEM — C50.912 MALIGNANT NEOPLASM OF LEFT BREAST (HCC): Status: ACTIVE | Noted: 2020-04-27

## 2020-04-27 PROBLEM — D05.82: Status: ACTIVE | Noted: 2020-04-27

## 2020-05-01 RX ORDER — VITAMIN B COMPLEX
1 CAPSULE ORAL DAILY
COMMUNITY

## 2020-05-01 RX ORDER — CHOLECALCIFEROL (VITAMIN D3) 1250 MCG
1 CAPSULE ORAL WEEKLY
COMMUNITY

## 2020-05-01 RX ORDER — CYANOCOBALAMIN 1000 UG/ML
1000 INJECTION INTRAMUSCULAR; SUBCUTANEOUS
COMMUNITY

## 2020-05-05 ENCOUNTER — HOSPITAL ENCOUNTER (OUTPATIENT)
Age: 50
Discharge: HOME | End: 2020-05-05
Payer: COMMERCIAL

## 2020-05-05 PROCEDURE — U0003 INFECTIOUS AGENT DETECTION BY NUCLEIC ACID (DNA OR RNA); SEVERE ACUTE RESPIRATORY SYNDROME CORONAVIRUS 2 (SARS-COV-2) (CORONAVIRUS DISEASE [COVID-19]), AMPLIFIED PROBE TECHNIQUE, MAKING USE OF HIGH THROUGHPUT TECHNOLOGIES AS DESCRIBED BY CMS-2020-01-R: HCPCS

## 2020-05-07 LAB
SARS-COV-2: NOT DETECTED
SOURCE: NORMAL

## 2020-05-11 ENCOUNTER — ANESTHESIA (OUTPATIENT)
Dept: OPERATING ROOM | Age: 50
End: 2020-05-11
Payer: COMMERCIAL

## 2020-05-11 ENCOUNTER — HOSPITAL ENCOUNTER (OUTPATIENT)
Age: 50
Setting detail: OUTPATIENT SURGERY
Discharge: HOME | End: 2020-05-11
Attending: SURGERY | Admitting: SURGERY
Payer: COMMERCIAL

## 2020-05-11 ENCOUNTER — ANESTHESIA EVENT (OUTPATIENT)
Dept: OPERATING ROOM | Age: 50
End: 2020-05-11
Payer: COMMERCIAL

## 2020-05-11 ENCOUNTER — APPOINTMENT (OUTPATIENT)
Dept: GENERAL RADIOLOGY | Age: 50
End: 2020-05-11
Attending: SURGERY
Payer: COMMERCIAL

## 2020-05-11 VITALS
RESPIRATION RATE: 14 BRPM | HEART RATE: 85 BPM | HEIGHT: 64 IN | BODY MASS INDEX: 25.61 KG/M2 | TEMPERATURE: 97.3 F | DIASTOLIC BLOOD PRESSURE: 66 MMHG | SYSTOLIC BLOOD PRESSURE: 101 MMHG | OXYGEN SATURATION: 100 % | WEIGHT: 150 LBS

## 2020-05-11 VITALS
DIASTOLIC BLOOD PRESSURE: 41 MMHG | OXYGEN SATURATION: 100 % | SYSTOLIC BLOOD PRESSURE: 84 MMHG | RESPIRATION RATE: 46 BRPM

## 2020-05-11 PROBLEM — C50.911 MALIGNANT NEOPLASM OF RIGHT FEMALE BREAST (HCC): Status: ACTIVE | Noted: 2020-04-27

## 2020-05-11 LAB — PREGNANCY, URINE: NEGATIVE

## 2020-05-11 PROCEDURE — 6360000002 HC RX W HCPCS

## 2020-05-11 PROCEDURE — 6360000002 HC RX W HCPCS: Performed by: NURSE ANESTHETIST, CERTIFIED REGISTERED

## 2020-05-11 PROCEDURE — 2500000003 HC RX 250 WO HCPCS: Performed by: SURGERY

## 2020-05-11 PROCEDURE — 71045 X-RAY EXAM CHEST 1 VIEW: CPT

## 2020-05-11 PROCEDURE — 2580000003 HC RX 258: Performed by: ANESTHESIOLOGY

## 2020-05-11 PROCEDURE — 3600000012 HC SURGERY LEVEL 2 ADDTL 15MIN: Performed by: SURGERY

## 2020-05-11 PROCEDURE — 7100000010 HC PHASE II RECOVERY - FIRST 15 MIN: Performed by: SURGERY

## 2020-05-11 PROCEDURE — 3600000002 HC SURGERY LEVEL 2 BASE: Performed by: SURGERY

## 2020-05-11 PROCEDURE — 3700000001 HC ADD 15 MINUTES (ANESTHESIA): Performed by: SURGERY

## 2020-05-11 PROCEDURE — 3700000000 HC ANESTHESIA ATTENDED CARE: Performed by: SURGERY

## 2020-05-11 PROCEDURE — 77001 FLUOROGUIDE FOR VEIN DEVICE: CPT

## 2020-05-11 PROCEDURE — 2580000003 HC RX 258: Performed by: SURGERY

## 2020-05-11 PROCEDURE — 36561 INSERT TUNNELED CV CATH: CPT | Performed by: SURGERY

## 2020-05-11 PROCEDURE — 2709999900 HC NON-CHARGEABLE SUPPLY: Performed by: SURGERY

## 2020-05-11 PROCEDURE — 77001 FLUOROGUIDE FOR VEIN DEVICE: CPT | Performed by: SURGERY

## 2020-05-11 PROCEDURE — 6360000002 HC RX W HCPCS: Performed by: SURGERY

## 2020-05-11 PROCEDURE — 7100000011 HC PHASE II RECOVERY - ADDTL 15 MIN: Performed by: SURGERY

## 2020-05-11 PROCEDURE — 2500000003 HC RX 250 WO HCPCS: Performed by: ANESTHESIOLOGY

## 2020-05-11 PROCEDURE — C1788 PORT, INDWELLING, IMP: HCPCS | Performed by: SURGERY

## 2020-05-11 PROCEDURE — 84703 CHORIONIC GONADOTROPIN ASSAY: CPT

## 2020-05-11 DEVICE — PORT INFUS OD2.7MM ID1.5MM INTRO 8FR TI POLYUR CATH DETACH CT80STPD] ANGIODYNAMICS INC]: Type: IMPLANTABLE DEVICE | Site: CHEST  WALL | Status: FUNCTIONAL

## 2020-05-11 RX ORDER — OXYCODONE HYDROCHLORIDE AND ACETAMINOPHEN 5; 325 MG/1; MG/1
1 TABLET ORAL PRN
Status: DISCONTINUED | OUTPATIENT
Start: 2020-05-11 | End: 2020-05-11 | Stop reason: HOSPADM

## 2020-05-11 RX ORDER — MORPHINE SULFATE 10 MG/ML
2 INJECTION, SOLUTION INTRAMUSCULAR; INTRAVENOUS EVERY 5 MIN PRN
Status: DISCONTINUED | OUTPATIENT
Start: 2020-05-11 | End: 2020-05-11 | Stop reason: HOSPADM

## 2020-05-11 RX ORDER — OXYCODONE HYDROCHLORIDE 5 MG/1
5 TABLET ORAL EVERY 6 HOURS PRN
Qty: 15 TABLET | Refills: 0 | Status: SHIPPED | OUTPATIENT
Start: 2020-05-11 | End: 2020-05-18

## 2020-05-11 RX ORDER — ONDANSETRON 2 MG/ML
4 INJECTION INTRAMUSCULAR; INTRAVENOUS
Status: DISCONTINUED | OUTPATIENT
Start: 2020-05-11 | End: 2020-05-11 | Stop reason: HOSPADM

## 2020-05-11 RX ORDER — SODIUM CHLORIDE, SODIUM LACTATE, POTASSIUM CHLORIDE, CALCIUM CHLORIDE 600; 310; 30; 20 MG/100ML; MG/100ML; MG/100ML; MG/100ML
INJECTION, SOLUTION INTRAVENOUS CONTINUOUS
Status: DISCONTINUED | OUTPATIENT
Start: 2020-05-11 | End: 2020-05-11 | Stop reason: HOSPADM

## 2020-05-11 RX ORDER — MEPERIDINE HYDROCHLORIDE 50 MG/ML
12.5 INJECTION INTRAMUSCULAR; INTRAVENOUS; SUBCUTANEOUS EVERY 5 MIN PRN
Status: DISCONTINUED | OUTPATIENT
Start: 2020-05-11 | End: 2020-05-11 | Stop reason: HOSPADM

## 2020-05-11 RX ORDER — HEPARIN SODIUM (PORCINE) LOCK FLUSH IV SOLN 100 UNIT/ML 100 UNIT/ML
SOLUTION INTRAVENOUS PRN
Status: DISCONTINUED | OUTPATIENT
Start: 2020-05-11 | End: 2020-05-11 | Stop reason: ALTCHOICE

## 2020-05-11 RX ORDER — PROPOFOL 10 MG/ML
INJECTION, EMULSION INTRAVENOUS PRN
Status: DISCONTINUED | OUTPATIENT
Start: 2020-05-11 | End: 2020-05-11 | Stop reason: SDUPTHER

## 2020-05-11 RX ORDER — OXYCODONE HYDROCHLORIDE AND ACETAMINOPHEN 5; 325 MG/1; MG/1
2 TABLET ORAL PRN
Status: DISCONTINUED | OUTPATIENT
Start: 2020-05-11 | End: 2020-05-11 | Stop reason: HOSPADM

## 2020-05-11 RX ORDER — MORPHINE SULFATE 10 MG/ML
1 INJECTION, SOLUTION INTRAMUSCULAR; INTRAVENOUS EVERY 5 MIN PRN
Status: DISCONTINUED | OUTPATIENT
Start: 2020-05-11 | End: 2020-05-11 | Stop reason: HOSPADM

## 2020-05-11 RX ADMIN — PROPOFOL 450 MG: 10 INJECTION, EMULSION INTRAVENOUS at 11:30

## 2020-05-11 RX ADMIN — SODIUM CHLORIDE, POTASSIUM CHLORIDE, SODIUM LACTATE AND CALCIUM CHLORIDE: 600; 310; 30; 20 INJECTION, SOLUTION INTRAVENOUS at 09:57

## 2020-05-11 RX ADMIN — Medication 2 G: at 10:54

## 2020-05-11 RX ADMIN — LIDOCAINE HYDROCHLORIDE 2 ML: 10 INJECTION, SOLUTION EPIDURAL; INFILTRATION; INTRACAUDAL; PERINEURAL at 09:57

## 2020-05-11 ASSESSMENT — ENCOUNTER SYMPTOMS: SHORTNESS OF BREATH: 0

## 2020-05-11 ASSESSMENT — PAIN SCALES - GENERAL
PAINLEVEL_OUTOF10: 0
PAINLEVEL_OUTOF10: 0

## 2020-05-11 ASSESSMENT — PULMONARY FUNCTION TESTS
PIF_VALUE: 0

## 2020-05-11 ASSESSMENT — PAIN - FUNCTIONAL ASSESSMENT: PAIN_FUNCTIONAL_ASSESSMENT: 0-10

## 2020-05-11 ASSESSMENT — LIFESTYLE VARIABLES: SMOKING_STATUS: 0

## 2020-05-11 NOTE — OP NOTE
[address]                 [address] [address]                                OPERATIVE REPORT    PATIENT NAME: Grecia Lozano                      :        1970  MED REC NO:   [id number]                          ROOM:  ACCOUNT NO:   [de-identified]                           ADMIT DATE: 2020  PROVIDER:     Jagruti De Guzman MD    DATE OF PROCEDURE:  2020    OPERATION PERFORMED:  1. Insertion of left subclavian Port-A-Cath. 2.  Surgeon's use of fluoroscopy. PREOPERATIVE DIAGNOSES:  1. Right breast carcinoma. 2.  Left breast DCIS. POSTOPERATIVE DIAGNOSES:  1. Right breast carcinoma. 2.  Left breast DCIS. SURGEON:  Jagruti De Guzman MD    ANESTHESIA:  Total intravenous anesthesia. COMPLICATIONS:  None. ESTIMATED BLOOD LOSS:  Less than 50 mL. INDICATION FOR THE OPERATION:  A 50-year-old female with the  aforementioned diagnoses. She is planning to get chemotherapy at the  [facility]. A Port-A-Cath is requested to facilitate  her treatments. The risks and benefits of operative intervention were  explained. The patient understood them, accepted them, and wanted to  proceed. DESCRIPTION OF OPERATION:  The patient was brought to the operating  room. Total intravenous anesthesia was initiated. She was prepped and  draped in usual surgical sterile fashion. She was placed in  Trendelenburg position. The left subclavian vein was cannulated. The  guidewire was passed. Its position was confirmed with fluoroscopy. An  incision was made medial and lateral to the exit site of the wire. A  pocket was created on the left chest wall. The dilator and introducer  were passed over the guidewire in a Seldinger technique under  fluoroscopic guidance. The dilator and guidewire were removed. Catheter was inserted through the sheath. The sheath was peeled away.    Catheter was pulled back to length under

## 2020-05-11 NOTE — PROGRESS NOTES
.Patient pre-op admission complete see flow sheet. IV started by Nina Aguiar. Safety checks complete. Call light and family at bedside.

## 2020-10-15 ENCOUNTER — TELEPHONE (OUTPATIENT)
Dept: SURGERY | Age: 50
End: 2020-10-15

## 2020-10-15 NOTE — TELEPHONE ENCOUNTER
Called [facility] pt needs to go there for [facility], per Dr. Chayito Ferreira. I faxed referral to them and scanned into her chart.

## 2020-11-05 LAB
INTERNAL QC: NORMAL
SARS-COV-2, NAA: NEGATIVE

## 2020-11-14 LAB
ADENOVIRUS: NOT DETECTED
BORDETELLA PARAPERTUSSIS: NOT DETECTED
BORDETELLA PERTUSSIS: NOT DETECTED
CHLAMYDOPHILA PNEUMONIA PCR: NOT DETECTED
CORONAVIRUS 229E: NOT DETECTED
CORONAVIRUS HKU1: NOT DETECTED
CORONAVIRUS NL63: NOT DETECTED
CORONAVIRUS OC43: NOT DETECTED
HUMAN METAPNEUMOVIRUS: NOT DETECTED
HUMAN RHINOVIRUS/ENTEROVIRUS: NOT DETECTED
INFLUENZA A (NO SUBTYPE) BY PCR: NOT DETECTED
INFLUENZA A H1-2009: NOT DETECTED
INFLUENZA A H3: NOT DETECTED
INFLUENZA A: NOT DETECTED
INFLUENZA B: NOT DETECTED
MYCOPLASMA PNEUMONIAE: NOT DETECTED
PARAINFLUENZA 2: NOT DETECTED
PARAINFLUENZA 3: NOT DETECTED
PARAINFLUENZA 4: NOT DETECTED
PARAINFLUENZA1: NOT DETECTED
RESPIRATORY SYNCYTIAL VIRUS: NOT DETECTED
SARS-COV-2: NOT DETECTED

## (undated) DEVICE — SOLUTION IV 1000ML 0.9% SOD CHL

## (undated) DEVICE — INTENDED FOR TISSUE SEPARATION, AND OTHER PROCEDURES THAT REQUIRE A SHARP SURGICAL BLADE TO PUNCTURE OR CUT.: Brand: BARD-PARKER ® STAINLESS STEEL BLADES

## (undated) DEVICE — SUTURE PERMA-HAND SZ 2-0 L30IN NONABSORBABLE BLK L26MM SH K833H

## (undated) DEVICE — STANDARD HYPODERMIC NEEDLE,POLYPROPYLENE HUB: Brand: MONOJECT

## (undated) DEVICE — PACK,UNIVERSAL,SPLIT,II,AURORA: Brand: MEDLINE

## (undated) DEVICE — RESERVOIR,SUCTION,100CC,SILICONE: Brand: MEDLINE

## (undated) DEVICE — CANNULA NSL 13FT TUBE AD ETCO2 DIV SAMP M

## (undated) DEVICE — SURGICAL SET UP - SURE SET: Brand: MEDLINE INDUSTRIES, INC.

## (undated) DEVICE — DRAPE,CHEST,FENES,15X10,STERIL: Brand: MEDLINE

## (undated) DEVICE — SUTURE VCRL SZ 3-0 L18IN ABSRB UD L26MM SH 1/2 CIR J864D

## (undated) DEVICE — SUTURE MCRYL SZ 4-0 L27IN ABSRB UD L19MM PS-2 1/2 CIR PRIM Y426H

## (undated) DEVICE — 3M™ TEGADERM™ TRANSPARENT FILM DRESSING FRAME STYLE, 1626W, 4 IN X 4-3/4 IN (10 CM X 12 CM), 50/CT 4CT/CASE: Brand: 3M™ TEGADERM™

## (undated) DEVICE — DRAIN SURG 10FR 100% SIL RND END PERF W/ TRCR

## (undated) DEVICE — GLOVE SURG SZ 7 L12IN FNGR THK79MIL GRN LTX FREE

## (undated) DEVICE — 3M™ STERI-STRIP™ COMPOUND BENZOIN TINCTURE 40 BAGS/CARTON 4 CARTONS/CASE C1544: Brand: 3M™ STERI-STRIP™

## (undated) DEVICE — Device

## (undated) DEVICE — Z INACTIVE USE 2641837 CLIP LIG M BLU TI HRT SHP WIRE HORZ 600 PER BX

## (undated) DEVICE — DRAPE C ARM UNIV W41XL74IN CLR PLAS XR VELC CLSR POLY STRP

## (undated) DEVICE — SUTURE VCRL SZ 4-0 L18IN ABSRB UD L19MM PS-2 3/8 CIR PRIM J496H

## (undated) DEVICE — ELECTROSURGICAL PENCIL ROCKER SWITCH NON COATED BLADE ELECTRODE 10 FT (3 M) CORD HOLSTER: Brand: MEGADYNE

## (undated) DEVICE — CANNULA NSL O2 AD 7 FT TBNG SFT TCH STD CONN N FLARED PRNG

## (undated) DEVICE — SYRINGE 30CC LUER LOCK: Brand: CARDINAL HEALTH

## (undated) DEVICE — GLOVE SURG SZ 7 CRM LTX FREE POLYISOPRENE POLYMER BEAD ANTI

## (undated) DEVICE — INTENDED FOR TISSUE SEPARATION, AND OTHER PROCEDURES THAT REQUIRE A SHARP SURGICAL BLADE TO PUNCTURE OR CUT.: Brand: BARD-PARKER ® CARBON RIB-BACK BLADES

## (undated) DEVICE — GARMENT,MEDLINE,DVT,INT,CALF,MED, GEN2: Brand: MEDLINE

## (undated) DEVICE — SPONGE,LAP,18"X18",DLX,XR,ST,5/PK,40/PK: Brand: MEDLINE

## (undated) DEVICE — GLOVE ORANGE PI 8 1/2   MSG9085

## (undated) DEVICE — GOWN,AURORA,NONREINF,RAGLAN,XXL,STERILE: Brand: MEDLINE

## (undated) DEVICE — SPECIMEN ORIENTATION CHARMS, SIX DISTINCTLY SHAPED STERILE 10MM CHARMS: Brand: MARGINMAP

## (undated) DEVICE — 3M™ STERI-STRIP™ REINFORCED ADHESIVE SKIN CLOSURES, R1540, 1/8 IN X 3 IN (3 MM X 75 MM), 5 STRIPS/ENVELOPE: Brand: 3M™ STERI-STRIP™

## (undated) DEVICE — SYRINGE MED 10ML LUERLOCK TIP W/O SFTY DISP

## (undated) DEVICE — NEEDLE HYPO 25GA L1.5IN BLU POLYPR HUB S STL REG BVL STR

## (undated) DEVICE — AEGIS 1" DISK 4MM HOLE, PEEL OPEN: Brand: MEDLINE

## (undated) DEVICE — ADHESIVE SKIN CLSR 0.7ML TOP DERMBND ADV

## (undated) DEVICE — GAUZE,SPONGE,2"X2",8PLY,STERILE,LF,2'S: Brand: MEDLINE

## (undated) DEVICE — MAJOR SET UP PK

## (undated) DEVICE — ELECTRODE PT RET AD L9FT HI MOIST COND ADH HYDRGEL CORDED

## (undated) DEVICE — GAUZE,SPONGE,4"X4",8PLY,STRL,LF,10/TRAY: Brand: MEDLINE

## (undated) DEVICE — SURE SET-DOUBLE BASIN-LF: Brand: MEDLINE INDUSTRIES, INC.

## (undated) DEVICE — COVER LT HNDL BLU PLAS

## (undated) DEVICE — 3M™ TEGADERM™ TRANSPARENT FILM DRESSING FRAME STYLE, 1624W, 2-3/8 IN X 2-3/4 IN (6 CM X 7 CM), 100/CT 4CT/CASE: Brand: 3M™ TEGADERM™

## (undated) DEVICE — CHLORAPREP 26ML ORANGE

## (undated) DEVICE — JEWISH HOSPITAL TURNOVER KIT: Brand: MEDLINE INDUSTRIES, INC.

## (undated) DEVICE — E-Z CLEAN, NON-STICK, PTFE COATED, ELECTROSURGICAL BLADE ELECTRODE, MODIFIED EXTENDED INSULATION, 2.5 INCH (6.35 CM): Brand: MEGADYNE

## (undated) DEVICE — APPLICATOR PREP 26ML 0.7% IOD POVACRYLEX 74% ISO ALC ST

## (undated) DEVICE — E-Z CLEAN, NON-STICK, PTFE COATED, ELECTROSURGICAL BLADE ELECTRODE, 2.5 INCH (6.35 CM): Brand: EZ CLEAN

## (undated) DEVICE — PLATE ES AD W 9FT CRD 2